# Patient Record
Sex: MALE | Race: WHITE | NOT HISPANIC OR LATINO | Employment: FULL TIME | URBAN - METROPOLITAN AREA
[De-identification: names, ages, dates, MRNs, and addresses within clinical notes are randomized per-mention and may not be internally consistent; named-entity substitution may affect disease eponyms.]

---

## 2019-12-12 ENCOUNTER — OFFICE VISIT (OUTPATIENT)
Dept: URGENT CARE | Facility: CLINIC | Age: 84
End: 2019-12-12
Payer: MEDICARE

## 2019-12-12 VITALS
HEIGHT: 72 IN | HEART RATE: 85 BPM | BODY MASS INDEX: 28.17 KG/M2 | OXYGEN SATURATION: 98 % | SYSTOLIC BLOOD PRESSURE: 156 MMHG | DIASTOLIC BLOOD PRESSURE: 87 MMHG | RESPIRATION RATE: 12 BRPM | TEMPERATURE: 98 F | WEIGHT: 208 LBS

## 2019-12-12 DIAGNOSIS — S81.802A WOUND OF LEFT LEG, INITIAL ENCOUNTER: ICD-10-CM
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
## 2019-12-12 PROCEDURE — 90714 TD VACCINE GREATER THAN OR EQUAL TO 7YO WITH PRESERVATIVE IM: ICD-10-PCS | Mod: AT,S$GLB,, | Performed by: INTERNAL MEDICINE

## 2019-12-12 PROCEDURE — 90471 TD VACCINE GREATER THAN OR EQUAL TO 7YO WITH PRESERVATIVE IM: ICD-10-PCS | Mod: S$GLB,,, | Performed by: INTERNAL MEDICINE

## 2019-12-12 PROCEDURE — 99214 OFFICE O/P EST MOD 30 MIN: CPT | Mod: 25,S$GLB,, | Performed by: INTERNAL MEDICINE

## 2019-12-12 PROCEDURE — 90471 IMMUNIZATION ADMIN: CPT | Mod: S$GLB,,, | Performed by: INTERNAL MEDICINE

## 2019-12-12 PROCEDURE — 99214 PR OFFICE/OUTPT VISIT, EST, LEVL IV, 30-39 MIN: ICD-10-PCS | Mod: 25,S$GLB,, | Performed by: INTERNAL MEDICINE

## 2019-12-12 PROCEDURE — 90714 TD VACC NO PRESV 7 YRS+ IM: CPT | Mod: AT,S$GLB,, | Performed by: INTERNAL MEDICINE

## 2019-12-12 RX ORDER — MUPIROCIN 20 MG/G
OINTMENT TOPICAL
Qty: 22 G | Refills: 1 | Status: SHIPPED | OUTPATIENT
Start: 2019-12-12

## 2019-12-12 RX ORDER — ALBUTEROL SULFATE 0.83 MG/ML
SOLUTION RESPIRATORY (INHALATION)
COMMUNITY
Start: 2017-05-17

## 2019-12-12 RX ORDER — DILTIAZEM HYDROCHLORIDE 180 MG/1
CAPSULE, EXTENDED RELEASE ORAL
COMMUNITY
Start: 2017-05-17

## 2019-12-12 NOTE — PROGRESS NOTES
Subjective:       Patient ID: Arian Lewis is a 84 y.o. male.    Vitals:  height is 6' (1.829 m) and weight is 94.3 kg (208 lb). His oral temperature is 97.6 °F (36.4 °C). His blood pressure is 156/87 (abnormal) and his pulse is 85. His respiration is 12 and oxygen saturation is 98%.     Chief Complaint: Animal Bite    Pt c/o dog bite to lt anterior thigh X today    Animal Bite    The incident occurred today. The incident occurred in the street. There is an injury to the left thigh. The pain is mild. It is unlikely that a foreign body is present. Pertinent negatives include no abdominal pain, no light-headedness and no loss of consciousness.       Constitution: Negative for fatigue.   HENT: Negative for facial swelling and facial trauma.    Neck: Negative for neck stiffness.   Cardiovascular: Negative for chest trauma.   Eyes: Negative for eye trauma, double vision and blurred vision.   Gastrointestinal: Negative for abdominal trauma, abdominal pain and rectal bleeding.   Genitourinary: Negative for hematuria, genital trauma and pelvic pain.   Musculoskeletal: Negative for pain, trauma, joint swelling, abnormal ROM of joint and pain with walking.   Skin: Positive for wound. Negative for color change, abrasion and laceration.   Neurological: Negative for dizziness, history of vertigo, light-headedness, coordination disturbances, altered mental status and loss of consciousness.   Hematologic/Lymphatic: Negative for history of bleeding disorder.   Psychiatric/Behavioral: Negative for altered mental status.       Objective:      Physical Exam   Constitutional: He appears well-developed and well-nourished.   HENT:   Head: Normocephalic and atraumatic.   Neck: Normal range of motion. Neck supple.   Musculoskeletal: Normal range of motion.   Skin:   3 superficial wounds/puntures L lat thigh   Nursing note and vitals reviewed.        Assessment:       1. Dog bite, initial encounter    2. Wound of left leg, initial  encounter        Plan:         Dog bite, initial encounter  -     Td Vaccine (Adult)  -     mupirocin (BACTROBAN) 2 % ointment; Apply to affected area 2 times daily  Dispense: 22 g; Refill: 1    Wound of left leg, initial encounter  -     Td Vaccine (Adult)  -     mupirocin (BACTROBAN) 2 % ointment; Apply to affected area 2 times daily  Dispense: 22 g; Refill: 1

## 2021-01-15 ENCOUNTER — IMMUNIZATION (OUTPATIENT)
Dept: INTERNAL MEDICINE | Facility: CLINIC | Age: 86
End: 2021-01-15
Payer: MEDICARE

## 2021-01-15 DIAGNOSIS — Z23 NEED FOR VACCINATION: Primary | ICD-10-CM

## 2021-01-15 PROCEDURE — 91300 COVID-19, MRNA, LNP-S, PF, 30 MCG/0.3 ML DOSE VACCINE: CPT | Mod: PBBFAC,PO

## 2021-02-05 ENCOUNTER — IMMUNIZATION (OUTPATIENT)
Dept: INTERNAL MEDICINE | Facility: CLINIC | Age: 86
End: 2021-02-05
Payer: MEDICARE

## 2021-02-05 DIAGNOSIS — Z23 NEED FOR VACCINATION: Primary | ICD-10-CM

## 2021-02-05 PROCEDURE — 0002A COVID-19, MRNA, LNP-S, PF, 30 MCG/0.3 ML DOSE VACCINE: CPT | Mod: PBBFAC,PO

## 2021-02-05 PROCEDURE — 91300 COVID-19, MRNA, LNP-S, PF, 30 MCG/0.3 ML DOSE VACCINE: CPT | Mod: PBBFAC,PO
